# Patient Record
Sex: FEMALE | Race: WHITE
[De-identification: names, ages, dates, MRNs, and addresses within clinical notes are randomized per-mention and may not be internally consistent; named-entity substitution may affect disease eponyms.]

---

## 2017-01-18 NOTE — EMERGENCY ROOM VISIT NOTE
ED Visit Note


First contact with patient:  20:21


CHIEF COMPLAINT: Wrist injury 





HISTORY OF PRESENT ILLNESS: This 27-year-old female patient presents to the 

emergency department ambulatory complaining of pain in the right wrist after a 

fall.  The patient reports that she was at gymnastics with her daughter and fell

, landing directly onto the right wrist.  She went to an urgent care center and 

they placed her in a splint and sent her here.  The patient is not able to move 

their wrist. The patient states the pain is sharp and 9/10. No laceration, no 

weakness. No numbness or tingling. The patient denies any other injury. The 

patient is able to move their fingers and elbow without difficulty. The patient 

has not had a previous fracture to this wrist. The patient has taken no 

medication for the pain.  She does report some numbness in the thumb, but 

states that she is able to feel touch.





REVIEW OF SYSTEMS: A 6 system review of systems was performed with positives 

and pertinent negatives in the HPI. 





ALLERGIES: Clindamycin, tramadol





MEDICATIONS: See med list





PMH: Asthma


 


SOCIAL HISTORY: The patient lives locally with family.  She is a smoker.





PHYSICAL EXAM: Vital Signs: Reviewed Nurse's notes, vital signs stable. GENERAL

: This is a 27-year-old female, in no acute distress, but appears to be in pain

, well-developed, well-neurished. NEURO: Alert and oriented to person place and 

time. Normal sensation to light and sharp touch. MUSCULOSKELETAL: There is 

questionable deformity of the right wrist with significant dorsal soft tissue 

swelling. There is tenderness and edema over distal radial aspect of the wrist. 

There is no snuff box tenderness. Range of motion is significantly decreased 

due to discomfort. There is no tenderness of the elbow, hand or fingers.  

strength 4/5. Radial pulse 2+. SKIN: Normal and intact. The hand is warm and 

well perfused with capillary refill less than 2 seconds. 





EMERGENCY DEPARTMENT COURSE: I examined the patient.  The patient was given 1 

tablet OxyIR for pain.  An X-ray of the right wrist was reviewed by myself and 

radiology and showed the above fracture.  Orthopedics was consulted.  I spoke 

with Dr. Braun, who recommended placing the patient in a splint and sling and 

having her follow-up in the office within the next few days.  An Ortho-Glass 

sugar tong splint was placed under my direction and the position was 

satisfactory. Neurovascular status rechecked and intact.  She was given a home 

pack and prescription for OxyIR.  She will follow-up with orthopedics and will 

return for any worsening symptoms.  She verbalized understanding.  The patient 

was discharged home in good condition. 





DIAGNOSIS: Right distal radius and ulna fracture


Current/Historical Medications


Scheduled


Albuterol (Ventolin Hfa), 2 PUFFS INH QID


Methadone Hcl (Methadone Hcl), 73 MG PO WK


Mirtazapine (Remeron), 30 MG PO HS


Mometasone Furoate-Formoterol (Dulera 200/5 Mcg), 2 PUFFS INH BID





Scheduled PRN


Oxycodone Ir (Roxicodone Ir), 1-2 TAB PO Q4H PRN for Pain





Allergies


Coded Allergies:  


     Clindamycin (Verified  Allergy, Unknown, NAUSEA, 7/27/16)


     Tramadol (Verified  Allergy, Unknown, INTERACTS WITH METHADONE, 7/27/16)





Vital Signs











  Date Time  Temp Pulse Resp B/P Pulse Ox O2 Delivery O2 Flow Rate FiO2


 


1/18/17 21:42  86 18 112/70 97   


 


1/18/17 21:36  86 18 112/70 97 Room Air  


 


1/18/17 20:18 36.9 75 18 112/75 97 Room Air  











Medications Administered











 Medications


  (Trade)  Dose


 Ordered  Sig/Mario


 Route  Start Time


 Stop Time Status Last Admin


Dose Admin


 


 Oxycodone HCl


  (Roxicodone


 Immediate Rel Tab)  5 mg  NOW  STAT


 PO  1/18/17 20:26


 1/18/17 20:27 DC 1/18/17 20:50


5 MG


 


 Oxycodone HCl


  (Roxicodone


 Immediate Rel 5MG


 Home Pack)  1 homepack  UD  ONCE


 PO  1/18/17 21:30


 1/18/17 21:31 DC 1/18/17 21:37


1 HOMEPACK











Departure Information


Impression





 Primary Impression:  


 Closed fracture of right distal radius and ulna





Dispostion


Home / Self-Care





Condition


GOOD





Prescriptions





Oxycodone Ir (Roxicodone Ir) 5 Mg Tab


1-2 TAB PO Q4H Y for Pain, #20 TAB


   For Initial Treatment


   Prov: Janie Coleman PA-C         1/18/17





Referrals


Polly Villeda DO (PCP)








Robert Braun D.O.





Patient Instructions


My [facility]





Additional Instructions





You have been treated in the Emergency Department for Wrist Pain.  You have 

received pain medicine in the emergency department which impairs your ability 

to operate a vehicle. It is illegal for you to drive after receiving these 

medicines. 





You have been prescribed OxyIR to be used for pain control. This is a narcotic 

medication. You cannot drive or consume alcohol while on this medicine. This 

medicine should only be used for pain that cannot be controlled with over-the-

counter pain medicines.





For pain control, you can use the following over-the-counter medicines (if >11 yo):





- Regular strength (325mg/tab) Tylenol (acetaminophen) 2 tabs every 4-6 hours 

as needed. Do not exceed 12 tablets in a 24 hour period. Avoid taking more than 

4 grams (4000 mg) of Tylenol per day. This includes any other sources of 

acetaminophen you may take on a regular basis.





- Regular strength (200 mg/tab) Advil (ibuprofen) 1-2 tabs every 4-6 hours as 

needed. Do not exceed a dose of 3200 mg per day.





If this is a recent injury (<24 hrs), ice can be applied to the area of pain 

for the first 3 days to help decrease pain and inflammation.





You have been provided the number for an Orthopaedic Surgeon. You should call 

this number as soon as possible to establish a follow-up visit from today's 

Emergency Department visit.





Keep the splint in place until evaluated by Orthopedics.  To NOT get the splint 

wet.





Return to the Emergency Department if your current symptoms worsen despite 

treatment course outlined above, or if you develop any of the following symptoms

: intractable pain despite aforementioned treatment course or new onset of 

numbness or tingling of the fingers.

## 2017-01-18 NOTE — DIAGNOSTIC IMAGING REPORT
RIGHT WRIST 3 VIEWS



HISTORY:      RIGHT WRIST INJURY

Right



COMPARISON: None.



FINDINGS: Comminuted and impacted fracture at the distal radius which extends to

the articular surface. This demonstrates 2 mm of dorsal displacement and dorsal

angulation. There is soft tissue swelling within the right wrist. Essentially

nondisplaced fracture at the ulnar styloid.     No radiopaque foreign bodies.



IMPRESSION:  

Distal radius and ulnar styloid fractures as described above.







Electronically signed by:  Adrián Redmond M.D.

1/18/2017 8:52 PM



Dictated Date/Time:  1/18/2017 8:51 PM

## 2017-08-07 NOTE — DIAGNOSTIC IMAGING REPORT
ABD/PELVIS IV CONTRAST ONLY



CT DOSE: 329.01 mGy.cm



HISTORY: Pain. Nausea.  R11.2 Nausea and [id number] Abdominal pain,

[id number]



TECHNIQUE: Multiaxial CT images of the abdomen and pelvis were performed

following the use of intravenous contrast.  A dose lowering technique was

utilized adhering to the principles of ALARA.





COMPARISON STUDY: None.



FINDINGS: Lung bases are clear. Mild fatty infiltration of liver. Gallbladder is

negative for distention.



Pancreas is uniform. Kidneys negative for hydronephrosis. Medial to the inferior

right hepatic lobe is a 3 cm cystic nodule projecting most likely from the

posterior aspect of the a sending colon. This may represent a duplication cyst

versus colonic diverticulum. The bowel pattern . Is nonobstructive. Several

follicular cysts of the right ovary measured up to 1 cm. Bladder is midline.



IMPRESSION: 



1. Several small right ovarian follicular cysts.

2. Nonobstructive bowel pattern.





3. 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

from a sending colon area .







The above report was generated using voice recognition software.  It may contain

grammatical, syntax or spelling errors.







Electronically signed by:  Cuco Granda M.D.

8/7/2017 4:44 PM



Dictated Date/Time:  8/7/2017 4:37 PM

## 2018-04-23 ENCOUNTER — HOSPITAL ENCOUNTER (OUTPATIENT)
Dept: HOSPITAL 45 - C.LABPBG | Age: 28
Discharge: HOME | End: 2018-04-23
Attending: PHYSICIAN ASSISTANT
Payer: COMMERCIAL

## 2018-04-23 DIAGNOSIS — Z32.01: Primary | ICD-10-CM

## 2018-05-03 ENCOUNTER — HOSPITAL ENCOUNTER (OUTPATIENT)
Dept: HOSPITAL 45 - C.LABPBG | Age: 28
Discharge: HOME | End: 2018-05-03
Attending: FAMILY MEDICINE
Payer: COMMERCIAL

## 2018-05-03 DIAGNOSIS — R10.13: Primary | ICD-10-CM

## 2018-05-03 DIAGNOSIS — R63.4: ICD-10-CM

## 2018-05-03 DIAGNOSIS — R42: ICD-10-CM

## 2018-05-03 LAB
ALBUMIN SERPL-MCNC: 4.1 GM/DL (ref 3.4–5)
ALP SERPL-CCNC: 82 U/L (ref 45–117)
ALT SERPL-CCNC: 20 U/L (ref 12–78)
AST SERPL-CCNC: 15 U/L (ref 15–37)
BASOPHILS # BLD: 0.02 K/UL (ref 0–0.2)
BASOPHILS NFR BLD: 0.3 %
BUN SERPL-MCNC: 6 MG/DL (ref 7–18)
CALCIUM SERPL-MCNC: 8.8 MG/DL (ref 8.5–10.1)
CO2 SERPL-SCNC: 26 MMOL/L (ref 21–32)
CREAT SERPL-MCNC: 0.69 MG/DL (ref 0.6–1.2)
EOS ABS #: 0.27 K/UL (ref 0–0.5)
EOSINOPHIL NFR BLD AUTO: 273 K/UL (ref 130–400)
GLUCOSE SERPL-MCNC: 96 MG/DL (ref 70–99)
HCT VFR BLD CALC: 38.6 % (ref 37–47)
HGB BLD-MCNC: 13.7 G/DL (ref 12–16)
IG#: 0.01 K/UL (ref 0–0.02)
IMM GRANULOCYTES NFR BLD AUTO: 39.5 %
LIPASE: 85 U/L (ref 73–393)
LYMPHOCYTES # BLD: 2.39 K/UL (ref 1.2–3.4)
MCH RBC QN AUTO: 28.8 PG (ref 25–34)
MCHC RBC AUTO-ENTMCNC: 35.5 G/DL (ref 32–36)
MCV RBC AUTO: 81.1 FL (ref 80–100)
MONO ABS #: 0.43 K/UL (ref 0.11–0.59)
MONOCYTES NFR BLD: 7.1 %
NEUT ABS #: 2.93 K/UL (ref 1.4–6.5)
NEUTROPHILS # BLD AUTO: 4.5 %
NEUTROPHILS NFR BLD AUTO: 48.4 %
PMV BLD AUTO: 9.9 FL (ref 7.4–10.4)
POTASSIUM SERPL-SCNC: 3.8 MMOL/L (ref 3.5–5.1)
PROT SERPL-MCNC: 7.7 GM/DL (ref 6.4–8.2)
RED CELL DISTRIBUTION WIDTH CV: 14.1 % (ref 11.5–14.5)
RED CELL DISTRIBUTION WIDTH SD: 42.3 FL (ref 36.4–46.3)
SODIUM SERPL-SCNC: 135 MMOL/L (ref 136–145)
WBC # BLD AUTO: 6.05 K/UL (ref 4.8–10.8)